# Patient Record
Sex: FEMALE | Race: WHITE | NOT HISPANIC OR LATINO | Employment: FULL TIME | URBAN - METROPOLITAN AREA
[De-identification: names, ages, dates, MRNs, and addresses within clinical notes are randomized per-mention and may not be internally consistent; named-entity substitution may affect disease eponyms.]

---

## 2024-02-22 ENCOUNTER — APPOINTMENT (OUTPATIENT)
Dept: RADIOLOGY | Facility: HOSPITAL | Age: 49
End: 2024-02-22
Payer: COMMERCIAL

## 2024-02-22 ENCOUNTER — APPOINTMENT (OUTPATIENT)
Dept: CARDIOLOGY | Facility: HOSPITAL | Age: 49
End: 2024-02-22
Payer: COMMERCIAL

## 2024-02-22 ENCOUNTER — HOSPITAL ENCOUNTER (EMERGENCY)
Facility: HOSPITAL | Age: 49
Discharge: HOME | End: 2024-02-22
Attending: STUDENT IN AN ORGANIZED HEALTH CARE EDUCATION/TRAINING PROGRAM
Payer: COMMERCIAL

## 2024-02-22 VITALS
HEART RATE: 75 BPM | DIASTOLIC BLOOD PRESSURE: 82 MMHG | HEIGHT: 67 IN | OXYGEN SATURATION: 98 % | RESPIRATION RATE: 18 BRPM | SYSTOLIC BLOOD PRESSURE: 140 MMHG | WEIGHT: 138 LBS | BODY MASS INDEX: 21.66 KG/M2 | TEMPERATURE: 98.4 F

## 2024-02-22 DIAGNOSIS — I15.9 SECONDARY HYPERTENSION: ICD-10-CM

## 2024-02-22 DIAGNOSIS — J11.1 FLU: Primary | ICD-10-CM

## 2024-02-22 LAB
ALBUMIN SERPL BCP-MCNC: 4.4 G/DL (ref 3.4–5)
ALP SERPL-CCNC: 91 U/L (ref 33–110)
ALT SERPL W P-5'-P-CCNC: 10 U/L (ref 7–45)
ANION GAP SERPL CALC-SCNC: 11 MMOL/L (ref 10–20)
AST SERPL W P-5'-P-CCNC: 13 U/L (ref 9–39)
BILIRUB SERPL-MCNC: 0.3 MG/DL (ref 0–1.2)
BUN SERPL-MCNC: 7 MG/DL (ref 6–23)
CALCIUM SERPL-MCNC: 9.6 MG/DL (ref 8.6–10.3)
CARDIAC TROPONIN I PNL SERPL HS: 3 NG/L (ref 0–13)
CARDIAC TROPONIN I PNL SERPL HS: 3 NG/L (ref 0–13)
CHLORIDE SERPL-SCNC: 102 MMOL/L (ref 98–107)
CO2 SERPL-SCNC: 27 MMOL/L (ref 21–32)
CREAT SERPL-MCNC: 0.97 MG/DL (ref 0.5–1.05)
EGFRCR SERPLBLD CKD-EPI 2021: 72 ML/MIN/1.73M*2
ERYTHROCYTE [DISTWIDTH] IN BLOOD BY AUTOMATED COUNT: 11.8 % (ref 11.5–14.5)
FLUAV RNA RESP QL NAA+PROBE: DETECTED
FLUBV RNA RESP QL NAA+PROBE: NOT DETECTED
GLUCOSE SERPL-MCNC: 93 MG/DL (ref 74–99)
HCG UR QL IA.RAPID: NEGATIVE
HCT VFR BLD AUTO: 42.9 % (ref 36–46)
HGB BLD-MCNC: 14.6 G/DL (ref 12–16)
MAGNESIUM SERPL-MCNC: 1.87 MG/DL (ref 1.6–2.4)
MCH RBC QN AUTO: 32.5 PG (ref 26–34)
MCHC RBC AUTO-ENTMCNC: 34 G/DL (ref 32–36)
MCV RBC AUTO: 96 FL (ref 80–100)
NRBC BLD-RTO: 0 /100 WBCS (ref 0–0)
PLATELET # BLD AUTO: 224 X10*3/UL (ref 150–450)
POTASSIUM SERPL-SCNC: 3.9 MMOL/L (ref 3.5–5.3)
PROT SERPL-MCNC: 7.6 G/DL (ref 6.4–8.2)
RBC # BLD AUTO: 4.49 X10*6/UL (ref 4–5.2)
RSV RNA RESP QL NAA+PROBE: NOT DETECTED
SARS-COV-2 RNA RESP QL NAA+PROBE: NOT DETECTED
SODIUM SERPL-SCNC: 136 MMOL/L (ref 136–145)
WBC # BLD AUTO: 9.5 X10*3/UL (ref 4.4–11.3)

## 2024-02-22 PROCEDURE — 84484 ASSAY OF TROPONIN QUANT: CPT

## 2024-02-22 PROCEDURE — 80053 COMPREHEN METABOLIC PANEL: CPT

## 2024-02-22 PROCEDURE — 93005 ELECTROCARDIOGRAM TRACING: CPT

## 2024-02-22 PROCEDURE — 87637 SARSCOV2&INF A&B&RSV AMP PRB: CPT

## 2024-02-22 PROCEDURE — 71046 X-RAY EXAM CHEST 2 VIEWS: CPT

## 2024-02-22 PROCEDURE — 81025 URINE PREGNANCY TEST: CPT | Performed by: STUDENT IN AN ORGANIZED HEALTH CARE EDUCATION/TRAINING PROGRAM

## 2024-02-22 PROCEDURE — 36415 COLL VENOUS BLD VENIPUNCTURE: CPT

## 2024-02-22 PROCEDURE — 85027 COMPLETE CBC AUTOMATED: CPT

## 2024-02-22 PROCEDURE — 83735 ASSAY OF MAGNESIUM: CPT

## 2024-02-22 PROCEDURE — 99283 EMERGENCY DEPT VISIT LOW MDM: CPT | Mod: 25

## 2024-02-22 PROCEDURE — 71046 X-RAY EXAM CHEST 2 VIEWS: CPT | Performed by: RADIOLOGY

## 2024-02-22 ASSESSMENT — LIFESTYLE VARIABLES
EVER HAD A DRINK FIRST THING IN THE MORNING TO STEADY YOUR NERVES TO GET RID OF A HANGOVER: NO
HAVE PEOPLE ANNOYED YOU BY CRITICIZING YOUR DRINKING: NO
HAVE YOU EVER FELT YOU SHOULD CUT DOWN ON YOUR DRINKING: NO
EVER FELT BAD OR GUILTY ABOUT YOUR DRINKING: NO

## 2024-02-22 ASSESSMENT — PAIN SCALES - GENERAL: PAINLEVEL_OUTOF10: 6

## 2024-02-22 ASSESSMENT — PAIN - FUNCTIONAL ASSESSMENT: PAIN_FUNCTIONAL_ASSESSMENT: 0-10

## 2024-02-22 ASSESSMENT — COLUMBIA-SUICIDE SEVERITY RATING SCALE - C-SSRS
6. HAVE YOU EVER DONE ANYTHING, STARTED TO DO ANYTHING, OR PREPARED TO DO ANYTHING TO END YOUR LIFE?: NO
2. HAVE YOU ACTUALLY HAD ANY THOUGHTS OF KILLING YOURSELF?: NO
1. IN THE PAST MONTH, HAVE YOU WISHED YOU WERE DEAD OR WISHED YOU COULD GO TO SLEEP AND NOT WAKE UP?: NO

## 2024-02-22 ASSESSMENT — PAIN DESCRIPTION - PROGRESSION: CLINICAL_PROGRESSION: NOT CHANGED

## 2024-02-22 NOTE — DISCHARGE INSTRUCTIONS
You are found to have elevated blood pressure here in the emergency department I recommend that you follow-up with your primary care doctor soon as possible to have your blood pressure reevaluated and see if you require either a change in medication or be started on medication.    Diagnosed with flu.  Follow-up with your primary care doctor or return to the emergency department you have shortness of breath difficulty breathing new/worsening/concerning symptoms return to the emergency department.

## 2024-02-22 NOTE — Clinical Note
Marjan Frye was seen and treated in our emergency department on 2/22/2024.  She may return to work on 02/27/2024.       If you have any questions or concerns, please don't hesitate to call.      Mikaela Griggs PA-C

## 2024-02-22 NOTE — ED PROVIDER NOTES
"HPI   Chief Complaint   Patient presents with    Shortness of Breath    Flu Symptoms    River Phillip is a 48-year-old female with no significant past medical history presenting to the emergency department with mixed dry/product cough associated with chest tightness and shortness of breath for one week. Patient went to urgent care [facility] and was sent her for the chest tightness complaint. She works in [employer] around the public daily, but denies any recent travel/sick contacts. Her chest tightness is mid sternal, non-radiating, and feels like \"deisy wires \"when she is coughing.  Family is also experiencing increased shortness of breath which she attributes to her congestion.  At the beginning of her symptoms, she was taking Tylenol/ibuprofen/other over-the-counter flu/cold medications with minimal improvement in her symptoms.  She has not had any medications in the past couple days.  Endorses chills and believes she had a fever at the beginning of her symptoms, but never took her temperature.  Patient is also having a diffuse headache without visual changes, lightheadedness, dizziness, nausea, vomiting, weakness, numbness, tingling.  No abdominal pain.  Patient denies any personal history of ACS, lung pathology, or sudden cardiac death in the family. No hemoptysis.                           Johanny Coma Scale Score: 15                     Patient History   No past medical history on file.  No past surgical history on file.  No family history on file.  Social History     Tobacco Use    Smoking status: Not on file    Smokeless tobacco: Not on file   Substance Use Topics    Alcohol use: Not on file    Drug use: Not on file       Physical Exam   ED Triage Vitals [02/22/24 1031]   Temperature Heart Rate Respirations BP   36.9 °C (98.4 °F) 85 18 (!) 166/101      Pulse Ox Temp Source Heart Rate Source Patient Position   97 % Temporal -- Sitting      BP Location FiO2 (%)     Left arm --       Physical " Exam  Constitutional:       Appearance: Normal appearance.   HENT:      Right Ear: Tympanic membrane, ear canal and external ear normal.      Left Ear: Tympanic membrane, ear canal and external ear normal.      Mouth/Throat:      Mouth: Mucous membranes are moist.      Pharynx: Oropharynx is clear.   Eyes:      Extraocular Movements: Extraocular movements intact.   Cardiovascular:      Rate and Rhythm: Normal rate and regular rhythm.      Pulses: Normal pulses.      Heart sounds: Normal heart sounds.   Pulmonary:      Effort: Pulmonary effort is normal. No respiratory distress.      Breath sounds: Normal breath sounds. No stridor. No wheezing, rhonchi or rales.   Abdominal:      General: Abdomen is flat. There is no distension.      Palpations: Abdomen is soft.      Tenderness: There is no abdominal tenderness. There is no guarding or rebound.   Musculoskeletal:      Cervical back: Normal range of motion.   Neurological:      Mental Status: She is alert and oriented to person, place, and time.         ED Course & MDM   ED Course as of 02/22/24 1248   Thu Feb 22, 2024   1105 EKG normal sinus rate and rhythm.  No STEMI.  QTc 428. [AH]   1116 This patient was seen by the advanced practice provider.  I have personally performed a substantiative portion of the encounter.  I have seen and examined the patient; I agree with the workup, evaluation, MDM, management and diagnosis.  The care plan was discussed.    I personally saw the patient and made/approved the management plan and take responsibility for patient management:    My assessment revealed:    48-year-old female no pertinent past medical history presents emergency department for chest tightness cough sputum production and feeling unwell for the past few days.  She went to a clinic with sent her here for evaluation.  On examination vital signs are stable 2+ peripheral pulses abdomen nontender.  She describes her discomfort to be chest tightness in nature no  exacerbating or alleviating symptoms she denies any wheezing however has had cough and sputum production.  She reports no sick contacts.  Patient reports no other history at this time.  EKG interpreted by me sinus rhythm left axis deviation nonpathologic ventricular at 76  QRS 78 QTc 428 no other acute ischemic changes appreciated patient will have delta troponin influenza COVID-19 testing CBC CMP chest x-ray performed.  Differential diagnosis includes pneumonia COVID-19 influenza.  Differential unlikely is pulmonary embolism cardiac tamponade Boerhaave's or aortic dissection.  Patient is low Wells risk and PERC negative.  Delta troponin has been ordered to rule out ACS. [ZS]   1237 Flu A Result(!): Detected [AH]      ED Course User Index  [AH] Mikaela Griggs PA-C  [ZS] Colin May MD         Diagnoses as of 02/22/24 1248   Flu   Secondary hypertension       Medical Decision Making  Marjan is a 48-year-old female with no significant past medical history presenting to the emergency department with mixed dry/product cough associated with chest tightness and shortness of breath for one week.  No personal cardiac or lung history.    Patient is well-appearing on exam and vital signs are stable.  Differentials include COVID, influenza, RSV, other viral illness, pneumonia, ACS, arrhythmia, etc.  Workup included CBC, CMP, magnesium, troponin x 2, PCR swabs, EKG, chest x-ray.  Laboratory workup essentially unremarkable.  PCR swabs POSITIVE for Influenza A.  At this time, patient is not a candidate for Tamiflu as she has had her symptoms for greater than 48 hours.  We discussed all results and supportive care for flu including alternating Tylenol/ibuprofen, rest, and adequate hydration.  She should quarantine for five days and follow-up with primary care provider after quarantine.  Reasons to return the emergency room include chest pain and shortness of breath.  Patient agreeable to plan and all questions and  concerns addressed.        Procedure  Procedures     Mikaela Griggs PA-C  02/22/24 1909

## 2024-02-27 LAB
ATRIAL RATE: 74 BPM
P AXIS: 69 DEGREES
PR INTERVAL: 167 MS
Q ONSET: 251 MS
QRS COUNT: 12 BEATS
QRS DURATION: 98 MS
QT INTERVAL: 380 MS
QTC CALCULATION(BAZETT): 428 MS
QTC FREDERICIA: 411 MS
R AXIS: -44 DEGREES
T AXIS: 39 DEGREES
T OFFSET: 441 MS
VENTRICULAR RATE: 76 BPM